# Patient Record
Sex: MALE | Race: WHITE | URBAN - METROPOLITAN AREA
[De-identification: names, ages, dates, MRNs, and addresses within clinical notes are randomized per-mention and may not be internally consistent; named-entity substitution may affect disease eponyms.]

---

## 2019-08-13 ENCOUNTER — OFFICE VISIT (OUTPATIENT)
Dept: DERMATOLOGY | Age: 49
End: 2019-08-13
Payer: COMMERCIAL

## 2019-08-13 ENCOUNTER — HOSPITAL ENCOUNTER (OUTPATIENT)
Age: 49
Setting detail: SPECIMEN
Discharge: HOME | End: 2019-08-13
Payer: COMMERCIAL

## 2019-08-13 VITALS
HEIGHT: 71 IN | SYSTOLIC BLOOD PRESSURE: 108 MMHG | WEIGHT: 156.6 LBS | HEART RATE: 92 BPM | OXYGEN SATURATION: 95 % | DIASTOLIC BLOOD PRESSURE: 70 MMHG | BODY MASS INDEX: 21.92 KG/M2

## 2019-08-13 DIAGNOSIS — L82.1 SEBORRHEIC KERATOSES: ICD-10-CM

## 2019-08-13 DIAGNOSIS — L57.8 ACTINIC SKIN DAMAGE: ICD-10-CM

## 2019-08-13 DIAGNOSIS — L81.4 SOLAR LENTIGO: ICD-10-CM

## 2019-08-13 DIAGNOSIS — D48.5 NEOPLASM OF UNCERTAIN BEHAVIOR OF SKIN: Primary | ICD-10-CM

## 2019-08-13 PROCEDURE — 3017F COLORECTAL CA SCREEN DOC REV: CPT | Performed by: DERMATOLOGY

## 2019-08-13 PROCEDURE — G8420 CALC BMI NORM PARAMETERS: HCPCS | Performed by: DERMATOLOGY

## 2019-08-13 PROCEDURE — 99203 OFFICE O/P NEW LOW 30 MIN: CPT | Performed by: DERMATOLOGY

## 2019-08-13 PROCEDURE — 1036F TOBACCO NON-USER: CPT | Performed by: DERMATOLOGY

## 2019-08-13 PROCEDURE — 11102 TANGNTL BX SKIN SINGLE LES: CPT | Performed by: DERMATOLOGY

## 2019-08-13 PROCEDURE — G8427 DOCREV CUR MEDS BY ELIG CLIN: HCPCS | Performed by: DERMATOLOGY

## 2019-08-13 RX ORDER — ATORVASTATIN CALCIUM 20 MG/1
TABLET, FILM COATED ORAL
Refills: 11 | COMMUNITY
Start: 2019-07-31

## 2019-08-13 RX ORDER — BUSPIRONE HYDROCHLORIDE 5 MG/1
TABLET ORAL
Refills: 0 | Status: ON HOLD | COMMUNITY
Start: 2019-08-12 | End: 2020-08-25

## 2019-08-13 RX ORDER — AZITHROMYCIN 250 MG/1
250 TABLET, FILM COATED ORAL
COMMUNITY

## 2019-08-13 RX ORDER — LEVETIRACETAM 500 MG/1
500 TABLET ORAL
Status: ON HOLD | COMMUNITY
End: 2020-08-25

## 2019-08-13 RX ORDER — LIDOCAINE HYDROCHLORIDE AND EPINEPHRINE 10; 10 MG/ML; UG/ML
0.5 INJECTION, SOLUTION INFILTRATION; PERINEURAL ONCE
Status: COMPLETED | OUTPATIENT
Start: 2019-08-13 | End: 2019-08-13

## 2019-08-13 RX ORDER — DICYCLOMINE HYDROCHLORIDE 10 MG/1
CAPSULE ORAL
Refills: 0 | COMMUNITY
Start: 2019-08-12

## 2019-08-13 RX ORDER — ALBUTEROL SULFATE 90 UG/1
2 AEROSOL, METERED RESPIRATORY (INHALATION)
COMMUNITY

## 2019-08-13 RX ORDER — TOPIRAMATE 50 MG/1
50 TABLET, FILM COATED ORAL
COMMUNITY

## 2019-08-13 RX ORDER — OMEPRAZOLE 20 MG/1
CAPSULE, DELAYED RELEASE ORAL
Refills: 0 | COMMUNITY
Start: 2019-06-20

## 2019-08-13 RX ORDER — ALBUTEROL SULFATE 2.5 MG/3ML
SOLUTION RESPIRATORY (INHALATION)
Refills: 5 | COMMUNITY
Start: 2019-07-31

## 2019-08-13 RX ORDER — LEVETIRACETAM 250 MG/1
250 TABLET ORAL
COMMUNITY

## 2019-08-13 RX ADMIN — LIDOCAINE HYDROCHLORIDE AND EPINEPHRINE 0.5 ML: 10; 10 INJECTION, SOLUTION INFILTRATION; PERINEURAL at 16:19

## 2019-08-13 NOTE — PROGRESS NOTES
Dermatology Patient Note  [facility] PHYSICIANS  [facility]  [address] [address]  Dept: [phone]  Dept Fax: [phone]: 8/13/2019   REFERRING PROVIDER: No ref. provider found      Enio Scanlon is a 44 y.o. male  who presents today in the office for:    New Patient (Pt would like to have a full body skin check. He states that his dad had skin cancer. He has some lesions he is concerned about. )       HISTORY OF PRESENT ILLNESS:  44 y.o. male presenting for lesions  Location: arms and legs  Duration: months to years  Symptoms: some are sore  Course: persistent, changing  Exacerbating factors: none  Prior treatments: none  Dad had skin cancer      CURRENT MEDICATIONS:   Current Outpatient Medications   Medication Sig Dispense Refill    dicyclomine (BENTYL) 10 MG capsule TAKE 1 CAPSULE BY ORAL ROUTE 3 TIMES EVERY DAY  0    busPIRone (BUSPAR) 5 MG tablet TAKE 1 TABLET BY ORAL ROUTE 2 TIMES EVERY DAY AS NEEDED  0    albuterol sulfate  (90 Base) MCG/ACT inhaler Inhale 2 puffs into the lungs      albuterol (PROVENTIL) (2.5 MG/3ML) 0.083% nebulizer solution USE 1 AMPULE IN BREATHING MACHINE FOUR TIMES A DAY AS NEEDED  5    ipratropium (ATROVENT) 0.02 % nebulizer solution USE 1 AMPULE IN BREATHING MACHINE EVERY 6 HOURS  5    topiramate (TOPAMAX) 50 MG tablet Take 50 mg by mouth      levETIRAcetam (KEPPRA) 500 MG tablet Take 500 mg by mouth      levETIRAcetam (KEPPRA) 250 MG tablet Take 250 mg by mouth      azithromycin (ZITHROMAX) 250 MG tablet Take 250 mg by mouth      atorvastatin (LIPITOR) 20 MG tablet TAKE 1 TABLET (20 MG TOTAL) BY MOUTH DAILY.   11    omeprazole (PRILOSEC) 20 MG delayed release capsule TAKE 1 CAPSULE (20 MG TOTAL) BY MOUTH DAILY.  0     Current Facility-Administered Medications   Medication Dose Route Frequency Provider Last Rate Last Dose    lidocaine-EPINEPHrine 1 [id number] injection 0.5 mL  0.5 mL Intradermal Once Felicia L is irritation from clothing with itching or bleeding. There is no way to prevent new spots from forming. Some lotions with alpha hydroxyl acids may make the areas feel smoother with regular use but will not eliminate them. OTC freezing techniques are available but usually not effective. When to [facility]  If a spot on the skin is growing, bleeding, painful, or itchy, or any other concerning changes, then see your doctor. BIOPSY WOUND CARE    A biopsy is where a small piece of skin tissue is removed and examined by a pathologist.  When a biopsy is done, there is a small wound site that requires proper care to prevent infection and scarring. Some biopsies require sutures and their removal.    How to Care for Biopsy Wound    A.  Leave band-aid or dressing on for 24 hours. B. Wash two times a day with soap and water. C.  Let the wound air dry, then apply Vaseline ointment and cover with a Band-Aid       unless otherwise instructed by your provider. D. If there is slight discomfort, you may give acetaminophen or ibuprofen. When To Call the Doctor    Call the Dermatology Clinic or your doctor if any of the following occur:    A. Redness and swelling  B. Tenderness and warm to touch  C.  Drainage from wound  D. Fever    Biopsy Results    Biopsy results are usually available in 1-2 weeks. We provide biopsy results in letters for begin results or we will call for any concerning results. If you have not heard from our staff please call the office within 2 weeks. Please call our office with any concerns at [phone]. Follow-up: No follow-ups on file. This note was created with the assistance of a speech-recognition program.  Although the intention is to generate a document that actually reflects the content of the visit, no guarantees can be provided that every mistake has been identified and corrected byediting.     Electronically signed by Yamile Forde MD on 8/13/19 at 2:03 PM

## 2019-08-13 NOTE — PATIENT INSTRUCTIONS
Biopsy Wound    A.  Leave band-aid or dressing on for 24 hours. B. Wash two times a day with soap and water. C.  Let the wound air dry, then apply Vaseline ointment and cover with a Band-Aid       unless otherwise instructed by your provider. D. If there is slight discomfort, you may give acetaminophen or ibuprofen. When To Call the Doctor    Call the Dermatology Clinic or your doctor if any of the following occur:    A. Redness and swelling  B. Tenderness and warm to touch  C.  Drainage from wound  D. Fever    Biopsy Results    Biopsy results are usually available in 1-2 weeks. We provide biopsy results in letters for begin results or we will call for any concerning results. If you have not heard from our staff please call the office within 2 weeks. Please call our office with any concerns at [phone].

## 2019-08-15 LAB — DERMATOLOGY PATHOLOGY REPORT: NORMAL

## 2020-07-16 ENCOUNTER — TELEPHONE (OUTPATIENT)
Dept: GASTROENTEROLOGY | Age: 50
End: 2020-07-16

## 2020-07-27 ENCOUNTER — OFFICE VISIT (OUTPATIENT)
Dept: GASTROENTEROLOGY | Age: 50
End: 2020-07-27
Payer: COMMERCIAL

## 2020-07-27 ENCOUNTER — TELEPHONE (OUTPATIENT)
Dept: GASTROENTEROLOGY | Age: 50
End: 2020-07-27

## 2020-07-27 VITALS — HEIGHT: 71 IN | RESPIRATION RATE: 20 BRPM | WEIGHT: 181 LBS | BODY MASS INDEX: 25.34 KG/M2 | TEMPERATURE: 98.5 F

## 2020-07-27 PROCEDURE — G8427 DOCREV CUR MEDS BY ELIG CLIN: HCPCS | Performed by: INTERNAL MEDICINE

## 2020-07-27 PROCEDURE — G8419 CALC BMI OUT NRM PARAM NOF/U: HCPCS | Performed by: INTERNAL MEDICINE

## 2020-07-27 PROCEDURE — 99204 OFFICE O/P NEW MOD 45 MIN: CPT | Performed by: INTERNAL MEDICINE

## 2020-07-27 PROCEDURE — 3017F COLORECTAL CA SCREEN DOC REV: CPT | Performed by: INTERNAL MEDICINE

## 2020-07-27 PROCEDURE — 4004F PT TOBACCO SCREEN RCVD TLK: CPT | Performed by: INTERNAL MEDICINE

## 2020-07-27 PROCEDURE — APPSS45 APP SPLIT SHARED TIME 31-45 MINUTES: Performed by: NURSE PRACTITIONER

## 2020-07-27 NOTE — PROGRESS NOTES
Reason for Referral:   Lavern Del Angel MD  [address], [address], [address]     HISTORY OF PRESENT ILLNESS:   Chief Complaint   Patient presents with    New Patient     Painful swallowing/ bchp. Patient states he isn't having any smytoms. New patient being seen for odynophagia. Patient reports that a few months ago he had chest pain and painful swallowing. He denies any use of biphosphonates, NSAIDS, tetracyclines, etc.  He does use proventil, atrovent nebulizers and albuterol rescue inhaler as needed. Over the past month he has not had any odynophagia, dysphagia. No dyspeptic symptoms. Bowel movements satisfactory. No melena, hematochezia. No family hx of colon cancer, IBD, pancreatitis, liver disease. Son has EoE. No hx of liver disease, pancreatitis. Has hx of COPD  Does smoke    Past Medical,Family, and Social History reviewed and does contribute to the patient presentingcondition. Patient's PMH/PSH,SH,PSYCH Hx, MEDs, ALLERGIES, and ROS were all reviewed and updated in the appropriate sections. PAST MEDICAL HISTORY:  No past medical history on file. No past surgical history on file.     CURRENT MEDICATIONS:    Current Outpatient Medications:     dicyclomine (BENTYL) 10 MG capsule, TAKE 1 CAPSULE BY ORAL ROUTE 3 TIMES EVERY DAY, Disp: , Rfl: 0    busPIRone (BUSPAR) 5 MG tablet, TAKE 1 TABLET BY ORAL ROUTE 2 TIMES EVERY DAY AS NEEDED, Disp: , Rfl: 0    albuterol sulfate  (90 Base) MCG/ACT inhaler, Inhale 2 puffs into the lungs, Disp: , Rfl:     albuterol (PROVENTIL) (2.5 MG/3ML) 0.083% nebulizer solution, USE 1 AMPULE IN BREATHING MACHINE FOUR TIMES A DAY AS NEEDED, Disp: , Rfl: 5    ipratropium (ATROVENT) 0.02 % nebulizer solution, USE 1 AMPULE IN BREATHING MACHINE EVERY 6 HOURS, Disp: , Rfl: 5    topiramate (TOPAMAX) 50 MG tablet, Take 50 mg by mouth, Disp: , Rfl:     levETIRAcetam (KEPPRA) 500 MG tablet, Take 500 mg by mouth, Disp: , Rfl:     levETIRAcetam (KEPPRA) 250 MG tablet, Take 250 mg by mouth, Disp: , Rfl:     azithromycin (ZITHROMAX) 250 MG tablet, Take 250 mg by mouth, Disp: , Rfl:     atorvastatin (LIPITOR) 20 MG tablet, TAKE 1 TABLET (20 MG TOTAL) BY MOUTH DAILY. , Disp: , Rfl: 11    omeprazole (PRILOSEC) 20 MG delayed release capsule, TAKE 1 CAPSULE (20 MG TOTAL) BY MOUTH DAILY. , Disp: , Rfl: 0    ALLERGIES:   Allergies   Allergen Reactions    Codeine        FAMILY HISTORY: No family history on file.       SOCIAL HISTORY:   Social History     Socioeconomic History    Marital status:      Spouse name: Not on file    Number of children: Not on file    Years of education: Not on file    Highest education level: Not on file   Occupational History    Not on file   Social Needs    Financial resource strain: Not on file    Food insecurity     Worry: Not on file     Inability: Not on file    Transportation needs     Medical: Not on file     Non-medical: Not on file   Tobacco Use    Smoking status: Current Some Day Smoker    Smokeless tobacco: Never Used   Substance and Sexual Activity    Alcohol use: Yes     Comment: occ    Drug use: Yes     Types: Marijuana    Sexual activity: Not on file   Lifestyle    Physical activity     Days per week: Not on file     Minutes per session: Not on file    Stress: Not on file   Relationships    Social connections     Talks on phone: Not on file     Gets together: Not on file     Attends Roman [employer] service: Not on file     Active member of club or organization: Not on file     Attends meetings of clubs or organizations: Not on file     Relationship status: Not on file    Intimate partner violence     Fear of current or ex partner: Not on file     Emotionally abused: Not on file     Physically abused: Not on file     Forced sexual activity: Not on file   Other Topics Concern    Not on file   Social History Narrative    Not on file       REVIEW OF SYSTEMS: Review of Systems   Constitutional: Positive for unexpected weight change. HENT: Positive for dental problem. Eyes: Positive for visual disturbance. Musculoskeletal: Positive for arthralgias. Psychiatric/Behavioral: Positive for sleep disturbance. The patient is nervous/anxious. All other systems reviewed and are negative. PHYSICAL EXAMINATION: Vital signs reviewed per the nursing documentation. Temp 98.5 °F (36.9 °C)   Resp 20   Ht 5' 11\" (1.803 m)   Wt 181 lb (82.1 kg)   BMI 25.24 kg/m²   Body mass index is 25.24 kg/m². Physical Exam  Vitals signs and nursing note reviewed. Constitutional:       General: He is not in acute distress. Appearance: He is well-developed. HENT:      Head: Normocephalic and atraumatic. Mouth/Throat:      Pharynx: No oropharyngeal exudate. Eyes:      General: No scleral icterus. Conjunctiva/sclera: Conjunctivae normal.      Pupils: Pupils are equal, round, and reactive to light. Neck:      Musculoskeletal: Normal range of motion and neck supple. Thyroid: No thyromegaly. Trachea: No tracheal deviation. Cardiovascular:      Rate and Rhythm: Normal rate and regular rhythm. Heart sounds: Normal heart sounds. No murmur. Pulmonary:      Effort: Pulmonary effort is normal. No respiratory distress. Breath sounds: Normal breath sounds. No wheezing or rales. Abdominal:      General: Bowel sounds are normal. There is no distension. Palpations: Abdomen is soft. There is no hepatomegaly or mass. Tenderness: There is no abdominal tenderness. There is no guarding or rebound. Hernia: No hernia is present. Lymphadenopathy:      Cervical: No cervical adenopathy. Skin:     General: Skin is warm and dry. Findings: No erythema or rash. Neurological:      Mental Status: He is alert and oriented to person, place, and time. Cranial Nerves: No cranial nerve deficit.    Psychiatric:         Thought Content:

## 2020-07-27 NOTE — TELEPHONE ENCOUNTER
egd and colonoscopy ordered by Emery Crooks CNP at office visit on 07/27/20. Palomo Helms will check his schedule and call back to schedule.

## 2020-07-31 RX ORDER — POLYETHYLENE GLYCOL 3350 17 G/17G
POWDER, FOR SOLUTION ORAL
Qty: 238 G | Refills: 0 | Status: SHIPPED | OUTPATIENT
Start: 2020-07-31

## 2020-08-03 NOTE — TELEPHONE ENCOUNTER
Talked to Palomo Helms to inform him of Covid-19 testing on 08/21/20 @ 1 pm at [address] [place] [address]. He voiced confirmation.

## 2020-08-21 ENCOUNTER — HOSPITAL ENCOUNTER (OUTPATIENT)
Dept: PREADMISSION TESTING | Age: 50
Setting detail: SPECIMEN
Discharge: HOME | End: 2020-08-25
Payer: COMMERCIAL

## 2020-08-21 PROCEDURE — U0003 INFECTIOUS AGENT DETECTION BY NUCLEIC ACID (DNA OR RNA); SEVERE ACUTE RESPIRATORY SYNDROME CORONAVIRUS 2 (SARS-COV-2) (CORONAVIRUS DISEASE [COVID-19]), AMPLIFIED PROBE TECHNIQUE, MAKING USE OF HIGH THROUGHPUT TECHNOLOGIES AS DESCRIBED BY CMS-2020-01-R: HCPCS

## 2020-08-22 PROCEDURE — U0003 INFECTIOUS AGENT DETECTION BY NUCLEIC ACID (DNA OR RNA); SEVERE ACUTE RESPIRATORY SYNDROME CORONAVIRUS 2 (SARS-COV-2) (CORONAVIRUS DISEASE [COVID-19]), AMPLIFIED PROBE TECHNIQUE, MAKING USE OF HIGH THROUGHPUT TECHNOLOGIES AS DESCRIBED BY CMS-2020-01-R: HCPCS

## 2020-08-24 ENCOUNTER — TELEPHONE (OUTPATIENT)
Dept: GASTROENTEROLOGY | Age: 50
End: 2020-08-24

## 2020-08-24 LAB — SARS-COV-2, NAA: NOT DETECTED

## 2020-08-24 NOTE — TELEPHONE ENCOUNTER
Talked to [facility] regarding Steph Haney, she states he can proceed with EGD/colonoscopy on 08/25/20 and she will let Dr Carlyle Gray know he had been taking Iburpofen. Returned call to Steph Haney and he will keep procedure and will start bowel prep.

## 2020-08-24 NOTE — TELEPHONE ENCOUNTER
Arin Kim called to say he has taken 800 mg of Ibuprofen for approximately 5 days, with the last dose being on 08/23/20. He his scheduled for an EGD/colon with Dr Jl Addison on 08/25/20. Would he be ok to proceed with the procedure?

## 2020-08-25 ENCOUNTER — HOSPITAL ENCOUNTER (OUTPATIENT)
Age: 50
Setting detail: OUTPATIENT SURGERY
Discharge: HOME | End: 2020-08-25
Attending: INTERNAL MEDICINE | Admitting: INTERNAL MEDICINE
Payer: COMMERCIAL

## 2020-08-25 ENCOUNTER — ANESTHESIA EVENT (OUTPATIENT)
Dept: ENDOSCOPY | Age: 50
End: 2020-08-25
Payer: COMMERCIAL

## 2020-08-25 ENCOUNTER — ANESTHESIA (OUTPATIENT)
Dept: ENDOSCOPY | Age: 50
End: 2020-08-25
Payer: COMMERCIAL

## 2020-08-25 VITALS
TEMPERATURE: 98 F | HEART RATE: 55 BPM | OXYGEN SATURATION: 96 % | RESPIRATION RATE: 16 BRPM | WEIGHT: 180 LBS | HEIGHT: 71 IN | BODY MASS INDEX: 25.2 KG/M2 | DIASTOLIC BLOOD PRESSURE: 78 MMHG | SYSTOLIC BLOOD PRESSURE: 126 MMHG

## 2020-08-25 VITALS — DIASTOLIC BLOOD PRESSURE: 61 MMHG | TEMPERATURE: 96.8 F | SYSTOLIC BLOOD PRESSURE: 102 MMHG | OXYGEN SATURATION: 100 %

## 2020-08-25 PROCEDURE — 6360000002 HC RX W HCPCS: Performed by: ANESTHESIOLOGY

## 2020-08-25 PROCEDURE — 2580000003 HC RX 258: Performed by: ANESTHESIOLOGY

## 2020-08-25 PROCEDURE — 43239 EGD BIOPSY SINGLE/MULTIPLE: CPT | Performed by: INTERNAL MEDICINE

## 2020-08-25 PROCEDURE — 6360000002 HC RX W HCPCS: Performed by: NURSE ANESTHETIST, CERTIFIED REGISTERED

## 2020-08-25 PROCEDURE — 88305 TISSUE EXAM BY PATHOLOGIST: CPT

## 2020-08-25 PROCEDURE — 2709999900 HC NON-CHARGEABLE SUPPLY: Performed by: INTERNAL MEDICINE

## 2020-08-25 PROCEDURE — 3609012400 HC EGD TRANSORAL BIOPSY SINGLE/MULTIPLE: Performed by: INTERNAL MEDICINE

## 2020-08-25 PROCEDURE — 7100000000 HC PACU RECOVERY - FIRST 15 MIN: Performed by: INTERNAL MEDICINE

## 2020-08-25 PROCEDURE — 3700000001 HC ADD 15 MINUTES (ANESTHESIA): Performed by: INTERNAL MEDICINE

## 2020-08-25 PROCEDURE — 3700000000 HC ANESTHESIA ATTENDED CARE: Performed by: INTERNAL MEDICINE

## 2020-08-25 PROCEDURE — 3609010300 HC COLONOSCOPY W/BIOPSY SINGLE/MULTIPLE: Performed by: INTERNAL MEDICINE

## 2020-08-25 PROCEDURE — 7100000001 HC PACU RECOVERY - ADDTL 15 MIN: Performed by: INTERNAL MEDICINE

## 2020-08-25 PROCEDURE — 45385 COLONOSCOPY W/LESION REMOVAL: CPT | Performed by: INTERNAL MEDICINE

## 2020-08-25 RX ORDER — HYDROXYZINE PAMOATE 25 MG/1
25 CAPSULE ORAL PRN
Status: ON HOLD | COMMUNITY
End: 2020-08-25

## 2020-08-25 RX ORDER — ONDANSETRON 2 MG/ML
4 INJECTION INTRAMUSCULAR; INTRAVENOUS
Status: DISCONTINUED | OUTPATIENT
Start: 2020-08-25 | End: 2020-08-25 | Stop reason: HOSPADM

## 2020-08-25 RX ORDER — SODIUM CHLORIDE, SODIUM LACTATE, POTASSIUM CHLORIDE, CALCIUM CHLORIDE 600; 310; 30; 20 MG/100ML; MG/100ML; MG/100ML; MG/100ML
INJECTION, SOLUTION INTRAVENOUS CONTINUOUS
Status: DISCONTINUED | OUTPATIENT
Start: 2020-08-25 | End: 2020-08-25 | Stop reason: HOSPADM

## 2020-08-25 RX ORDER — DIPHENHYDRAMINE HYDROCHLORIDE 50 MG/ML
12.5 INJECTION INTRAMUSCULAR; INTRAVENOUS
Status: DISCONTINUED | OUTPATIENT
Start: 2020-08-25 | End: 2020-08-25 | Stop reason: HOSPADM

## 2020-08-25 RX ORDER — KETOROLAC TROMETHAMINE 30 MG/ML
30 INJECTION, SOLUTION INTRAMUSCULAR; INTRAVENOUS ONCE
Status: COMPLETED | OUTPATIENT
Start: 2020-08-25 | End: 2020-08-25

## 2020-08-25 RX ORDER — OXYCODONE HYDROCHLORIDE AND ACETAMINOPHEN 5; 325 MG/1; MG/1
1 TABLET ORAL PRN
Status: DISCONTINUED | OUTPATIENT
Start: 2020-08-25 | End: 2020-08-25 | Stop reason: HOSPADM

## 2020-08-25 RX ORDER — PROPOFOL 10 MG/ML
INJECTION, EMULSION INTRAVENOUS PRN
Status: DISCONTINUED | OUTPATIENT
Start: 2020-08-25 | End: 2020-08-25 | Stop reason: SDUPTHER

## 2020-08-25 RX ORDER — LABETALOL HYDROCHLORIDE 5 MG/ML
5 INJECTION, SOLUTION INTRAVENOUS EVERY 10 MIN PRN
Status: DISCONTINUED | OUTPATIENT
Start: 2020-08-25 | End: 2020-08-25 | Stop reason: HOSPADM

## 2020-08-25 RX ORDER — LEVETIRACETAM 1000 MG/1
TABLET ORAL
COMMUNITY
Start: 2020-07-08

## 2020-08-25 RX ORDER — PROPOFOL 10 MG/ML
INJECTION, EMULSION INTRAVENOUS CONTINUOUS PRN
Status: DISCONTINUED | OUTPATIENT
Start: 2020-08-25 | End: 2020-08-25 | Stop reason: SDUPTHER

## 2020-08-25 RX ORDER — OXYCODONE HYDROCHLORIDE AND ACETAMINOPHEN 5; 325 MG/1; MG/1
2 TABLET ORAL PRN
Status: DISCONTINUED | OUTPATIENT
Start: 2020-08-25 | End: 2020-08-25 | Stop reason: HOSPADM

## 2020-08-25 RX ORDER — HYDROXYZINE HYDROCHLORIDE 25 MG/1
TABLET, FILM COATED ORAL
COMMUNITY
Start: 2020-06-19

## 2020-08-25 RX ADMIN — KETOROLAC TROMETHAMINE 30 MG: 30 INJECTION, SOLUTION INTRAMUSCULAR at 10:34

## 2020-08-25 RX ADMIN — PROPOFOL 100 MG: 10 INJECTION, EMULSION INTRAVENOUS at 10:46

## 2020-08-25 RX ADMIN — PROPOFOL 150 MCG/KG/MIN: 10 INJECTION, EMULSION INTRAVENOUS at 10:46

## 2020-08-25 RX ADMIN — SODIUM CHLORIDE, POTASSIUM CHLORIDE, SODIUM LACTATE AND CALCIUM CHLORIDE: 600; 310; 30; 20 INJECTION, SOLUTION INTRAVENOUS at 10:28

## 2020-08-25 ASSESSMENT — PAIN - FUNCTIONAL ASSESSMENT: PAIN_FUNCTIONAL_ASSESSMENT: 0-10

## 2020-08-25 ASSESSMENT — PAIN SCALES - GENERAL
PAINLEVEL_OUTOF10: 2
PAINLEVEL_OUTOF10: 4
PAINLEVEL_OUTOF10: 5

## 2020-08-25 ASSESSMENT — PULMONARY FUNCTION TESTS
PIF_VALUE: 0
PIF_VALUE: 1
PIF_VALUE: 0
PIF_VALUE: 1
PIF_VALUE: 0

## 2020-08-25 ASSESSMENT — PAIN DESCRIPTION - DESCRIPTORS: DESCRIPTORS: CRAMPING

## 2020-08-25 ASSESSMENT — LIFESTYLE VARIABLES: SMOKING_STATUS: 1

## 2020-08-25 ASSESSMENT — PAIN DESCRIPTION - PAIN TYPE: TYPE: ACUTE PAIN

## 2020-08-25 ASSESSMENT — PAIN DESCRIPTION - LOCATION: LOCATION: ABDOMEN

## 2020-08-25 ASSESSMENT — PAIN DESCRIPTION - ORIENTATION: ORIENTATION: ANTERIOR

## 2020-08-25 ASSESSMENT — PAIN DESCRIPTION - ONSET: ONSET: AWAKENED FROM SLEEP

## 2020-08-25 ASSESSMENT — PAIN DESCRIPTION - FREQUENCY: FREQUENCY: INTERMITTENT

## 2020-08-25 NOTE — ANESTHESIA PRE PROCEDURE
Department of Anesthesiology  Preprocedure Note       Name:  Rober Walton   Age:  44 y.o.  :  1970                                          MRN:  [id number]         Date:  2020      Surgeon: Cherise Schwartz):  Retia Oppenheim, MD    Procedure: Procedure(s):  EGD  COLORECTAL CANCER SCREENING, HIGH RISK    Medications prior to admission:   Prior to Admission medications    Medication Sig Start Date End Date Taking? Authorizing Provider   levETIRAcetam (KEPPRA) 1000 MG tablet TAKE 1 TABLET BY ORAL ROUTE  EVERY 12 HOURS 20  Yes Historical Provider, MD   hydrOXYzine (ATARAX) 25 MG tablet TAKE 1 - 2 TABLET BY ORAL ROUTE  EVERY DAY AS NEEDED 20  Yes Historical Provider, MD   OMEPRAZOLE PO Take 20 mg by mouth daily 20  Yes Historical Provider, MD   polyethylene glycol (GLYCOLAX) 17 GM/SCOOP powder Use as directed by following your patient instructions given by office. 20  Yes Retia Oppenheim, MD   bisacodyl (DULCOLAX) 5 MG EC tablet Use as directed per instructions given by physician office. 20  Yes Retia Oppenheim, MD   albuterol sulfate  (90 Base) MCG/ACT inhaler Inhale 2 puffs into the lungs   Yes Historical Provider, MD   albuterol (PROVENTIL) (2.5 MG/3ML) 0.083% nebulizer solution USE 1 AMPULE IN BREATHING MACHINE FOUR TIMES A DAY AS NEEDED 19  Yes Historical Provider, MD   ipratropium (ATROVENT) 0.02 % nebulizer solution USE 1 AMPULE IN BREATHING MACHINE EVERY 6 HOURS 19  Yes Historical Provider, MD   levETIRAcetam (KEPPRA) 250 MG tablet Take 250 mg by mouth   Yes Historical Provider, MD   atorvastatin (LIPITOR) 20 MG tablet TAKE 1 TABLET (20 MG TOTAL) BY MOUTH DAILY. 19  Yes Historical Provider, MD   omeprazole (PRILOSEC) 20 MG delayed release capsule TAKE 1 CAPSULE (20 MG TOTAL) BY MOUTH DAILY.  19  Yes Historical Provider, MD   dicyclomine (BENTYL) 10 MG capsule TAKE 1 CAPSULE BY ORAL ROUTE 3 TIMES EVERY DAY 19   Historical Provider, MD results found for: PROTIME, INR, APTT    HCG (If Applicable): No results found for: PREGTESTUR, PREGSERUM, HCG, HCGQUANT     ABGs: No results found for: PHART, PO2ART, MMK3ZVJ, NWB6LAU, BEART, M6CJXSUL     Type & Screen (If Applicable):  No results found for: LABABO, LABRH    Drug/Infectious Status (If Applicable):  No results found for: HIV, HEPCAB    COVID-19 Screening (If Applicable):   Lab Results   Component Value Date    COVID19 Not Detected 08/22/2020         Anesthesia Evaluation  Patient summary reviewed and Nursing notes reviewed no history of anesthetic complications:   Airway: Mallampati: II  TM distance: >3 FB   Neck ROM: full  Mouth opening: > = 3 FB Dental:      Comment: Poor dentition - denies loose teeth. Pulmonary:normal exam  breath sounds clear to auscultation  (+) COPD:  current smoker                          ROS comment: H/o thoracotomy left in the past   Cardiovascular:    (+) hyperlipidemia        Rhythm: regular  Rate: normal                    Neuro/Psych:   (+) seizures (Petit mal - last epidsode 2 weeks ago):,             GI/Hepatic/Renal: Neg GI/Hepatic/Renal ROS            Endo/Other:    (+) : arthritis: OA., .                 Abdominal:           Vascular: negative vascular ROS. Anesthesia Plan      MAC     ASA 3       Induction: intravenous. MIPS: Prophylactic antiemetics administered. Anesthetic plan and risks discussed with patient. Plan discussed with CRNA. Patient requesting pain medication for a lt foot pain - Toradol IVP ordered.       Vera Stanley MD   8/25/2020

## 2020-08-25 NOTE — ANESTHESIA POSTPROCEDURE EVALUATION
Department of Anesthesiology  Postprocedure Note    Patient: Pari Villegas  MRN: [id number]  YOB: 1970  Date of evaluation: 8/25/2020  Time:  12:52 PM     Procedure Summary     Date:  08/25/20 Room / Location:  [facility] ENDO 01 / [facility] ENDO    Anesthesia Start:  6862 Anesthesia Stop:  4244    Procedures:       EGD BIOPSY (N/A Esophagus)      COLONOSCOPY WITH BIOPSY (N/A ) Diagnosis:       (SCREEN FOR COLON CANCERPAINFUL SWALLOWING)      (PAT ON ADMIT)    Surgeon:  Suzette Clemente MD Responsible Provider:  Tony Jackson MD    Anesthesia Type:  MAC ASA Status:  3          Anesthesia Type: MAC    Raegan Phase I: Raegan Score: 10    Raegan Phase II:      Last vitals: Reviewed and per EMR flowsheets.        Anesthesia Post Evaluation    Comments: POST- ANESTHESIA EVALUATION       Pt Name: Pari Villegas  MRN: [id number]  [place]: 1970  Date of evaluation: 8/25/2020  Time:  12:52 PM      /78   Pulse 55   Temp 98 °F (36.7 °C)   Resp 16   Ht 5' 11\" (1.803 m)   Wt 180 lb (81.6 kg)   SpO2 96%   BMI 25.10 kg/m²      Consciousness Level  Awake  Cardiopulmonary Status  Stable  Pain Adequately Treated YES  Nausea / Vomiting  NO  Adequate Hydration  YES  Anesthesia Related Complications NONE      Electronically signed by Tony Jackson MD on 8/25/2020 at 12:52 PM

## 2020-08-25 NOTE — H&P
tobacco: Never Used   Substance and Sexual Activity    Alcohol use: Yes     Comment: occ    Drug use: Yes     Types: Marijuana    Sexual activity: Not on file   Lifestyle    Physical activity     Days per week: Not on file     Minutes per session: Not on file    Stress: Not on file   Relationships    Social connections     Talks on phone: Not on file     Gets together: Not on file     Attends Rastafarian service: Not on file     Active member of club or organization: Not on file     Attends meetings of clubs or organizations: Not on file     Relationship status: Not on file    Intimate partner violence     Fear of current or ex partner: Not on file     Emotionally abused: Not on file     Physically abused: Not on file     Forced sexual activity: Not on file   Other Topics Concern    Not on file   Social History Narrative    Not on file           REVIEW OF SYSTEMS      Allergies   Allergen Reactions    Codeine        No current facility-administered medications on file prior to encounter. Current Outpatient Medications on File Prior to Encounter   Medication Sig Dispense Refill    OMEPRAZOLE PO Take 20 mg by mouth daily      levETIRAcetam (KEPPRA) 1000 MG tablet TAKE 1 TABLET BY ORAL ROUTE  EVERY 12 HOURS      hydrOXYzine (ATARAX) 25 MG tablet TAKE 1 - 2 TABLET BY ORAL ROUTE  EVERY DAY AS NEEDED      polyethylene glycol (GLYCOLAX) 17 GM/SCOOP powder Use as directed by following your patient instructions given by office. 238 g 0    bisacodyl (DULCOLAX) 5 MG EC tablet Use as directed per instructions given by physician office.  2 tablet 0    dicyclomine (BENTYL) 10 MG capsule TAKE 1 CAPSULE BY ORAL ROUTE 3 TIMES EVERY DAY  0    albuterol sulfate  (90 Base) MCG/ACT inhaler Inhale 2 puffs into the lungs      albuterol (PROVENTIL) (2.5 MG/3ML) 0.083% nebulizer solution USE 1 AMPULE IN BREATHING MACHINE FOUR TIMES A DAY AS NEEDED  5    ipratropium (ATROVENT) 0.02 % nebulizer solution USE 1 AMPULE IN BREATHING MACHINE EVERY 6 HOURS  5    topiramate (TOPAMAX) 50 MG tablet Take 50 mg by mouth      levETIRAcetam (KEPPRA) 250 MG tablet Take 250 mg by mouth      azithromycin (ZITHROMAX) 250 MG tablet Take 250 mg by mouth      atorvastatin (LIPITOR) 20 MG tablet TAKE 1 TABLET (20 MG TOTAL) BY MOUTH DAILY. 11    omeprazole (PRILOSEC) 20 MG delayed release capsule TAKE 1 CAPSULE (20 MG TOTAL) BY MOUTH DAILY. 0       Negative except for what is mentioned in the HPI. GENERAL PHYSICAL EXAM     Vitals: /79   Pulse 74   Temp 97.8 °F (36.6 °C) (Infrared)   Resp 16   Ht 5' 11\" (1.803 m)   Wt 180 lb (81.6 kg)   SpO2 96%   BMI 25.10 kg/m²  Body mass index is 25.1 kg/m². GENERAL APPEARANCE:   Kai Roberson is 44 y.o.,  male, not obese, nourished, conscious, alert. Does not appear to be distress or pain at this time. SKIN:  Warm, dry, no cyanosis or jaundice. HEAD:  Normocephalic, atraumatic, no swelling or tenderness. EYES:  Pupils equal, reactive to light. EARS:  No discharge, no marked hearing loss. NOSE:  No rhinorrhea, epistaxis or septal deformity. THROAT:  Not congested. No ulceration bleeding or discharge. NECK:  No stiffness, trachea central.  No palpable masses or L.N.                 CHEST:  Symmetrical and equal on expansion. HEART:  RRR S1 > S2. No audible murmurs or gallops. LUNGS:  Equal on expansion, normal breath sounds. No adventitious sounds. ABDOMEN: Soft on palpation. No dysphagia, No localized tenderness. No guarding or rigidity. No palpable hepatosplenomegaly. LYMPHATICS:  No palpable cervical lymphadenopathy. LOCOMOTOR, BACK AND SPINE:  No tenderness or deformities. EXTREMITIES:  Symmetrical, no pretibial edema. Devins sign negative.   No discoloration or ulcerations. NEUROLOGIC:  The patient is conscious, alert, oriented,Cranial nerve II-XII intact, taste and smell were not examined. No apparent focal sensory or motor deficits. PROVISIONAL DIAGNOSES / SURGERY:    SCREEN FOR COLON CANCER  PAINFUL SWALLOWING  EGD   COLORECTAL CANCER SCREENING, HIGH RISK    There are no active problems to display for this patient.           RAMOS Carrasquillo - CNP on 8/25/2020 at 10:10 AM

## 2020-08-25 NOTE — OP NOTE
ESOPHAGOGASTRODUODENOSCOPY   ( EGD )  DATE OF PROCEDURE: 8/25/2020     SURGEON: Yuliya Vasquez MD    ASSISTANT: None    PREOPERATIVE DIAGNOSIS: Chronic GERD, odynophagia. Procedure performed to evaluate esophageal pathology    POSTOPERATIVE DIAGNOSIS: Small sliding hiatal hernia. No esophageal stricture, Calvo's mucosa or peptic esophagitis seen. OPERATION: Upper GI endoscopy with Biopsy    ANESTHESIA: MAC    ESTIMATED BLOOD LOSS: None    COMPLICATIONS: None. SPECIMENS:  Was Obtained: Random biopsies taken from the esophagus evaluate microscopic esophagitis    HISTORY: The patient is a 44y.o. year old male with history of above preop diagnosis. I recommended esophagogastroduodenoscopy with possible biopsy and I explained the risk, benefits, expected outcome, and alternatives to the procedure. Risks included but are not limited to bleeding, infection, respiratory distress, hypotension, and perforation of the esophagus, stomach, or duodenum. Patient understands and is in agreement. PROCEDURE: The patient was given IV conscious sedation. The patient's SPO2 remained above 90% throughout the procedure. Cetacaine spray given. Patient placed in left lateral position. Olympus  videogastroscope was inserted orally under vision into the esophagus without difficulty and advanced into the stomach then through the pylorus up to the second part of duodenum. Findings:    Retropharyngeal area was grossly normal appearing    Esophagus: normal.  No signs of peptic esophagitis, Calvo's mucosa seen. 2 cm long sliding hiatal hernia. Squamocolumnar junction is at about 40 cm from incisor teeth and lower esophageal sphincter opens normally.     Multiple biopsies taken from the body of the esophagus to rule out eosinophilic esophagitis, microscopic esophagitis    Stomach:    Fundus and Cardia Examined in Retroflexed View: normal    Body: normal    Antrum: normal    Duodenum:     Descending: normal    Bulb: normal    While withdrawing the scope the above findings were verified and the scope was removed. The patient has tolerated the procedure without unusual events. Recommendations/Plan:   1. F/U Biopsies  2. F/U In Office as instructed  3.  Discussed with the family                   Electronically signed by Navjot More MD  on 8/25/2020 at 10:51 AM

## 2020-08-25 NOTE — OP NOTE
COLONOSCOPY    DATE OF PROCEDURE: 8/25/2020    SURGEON: Sarah Miner MD    ASSISTANT: None    PREOPERATIVE DIAGNOSIS: Screening colonoscopy    POSTOPERATIVE DIAGNOSIS: Polyp right colon    OPERATION: Total colonoscopy and snare polypectomy    ANESTHESIA: MAC    ESTIMATED BLOOD LOSS: None    COMPLICATIONS: None     SPECIMENS:  Was Obtained: Polyp right colon    HISTORY: The patient is a 44y.o. year old male with history of above preop diagnosis. I recommended colonoscopy with possible biopsy or polypectomy and I explained the risk, benefits, expected outcome, and alternatives to the procedure. Risks included but are not limited to bleeding, infection, respiratory distress, hypotension, and perforation of the colon and possibility of missing a lesion. The patient understands and is in agreement. PROCEDURE:  The patient's SPO2 remained above 90% throughout the procedure. Digital rectal exam was normal.  The colonoscope was inserted through the anus into the rectum and advanced under direct vision to the cecum without difficulty. Terminal ileum was examined for approximately 2 inches. The prep was adequate. Findings:  Terminal ileum: normal    Cecum/Ascending colon: abnormal: In the upper part of the right colon there is a polyp which is about 7 mm flat. This polyp is removed using cold snare. Transverse colon: normal    Descending/Sigmoid colon: normal    Rectum/Anus: examined in normal and retroflexed positions and was normal    Withdrawal Time was (minutes): 12          The colon was decompressed and the scope was removed. The patient tolerated the procedure without unusual events. During the procedure, the patient's blood pressure, pulse and oxygen saturation remained stable and documented. No unusual events occurred during the procedure. Patient was transferred to recovery room and will be discharged when criteria is met. Recommendations/Plan:   1. F/U Biopsies  2.  F/U In Office as instructed  3. Discussed with the family  4. High fiber diet   5. Precautions to avoid constipation     Next colonoscopy: 3 -5 years.   If Colonoscopy is less than 10 years the recommended reason is due:polyps    Electronically signed by Yuliya Ram MD  on 8/25/2020 at 11:14 AM

## 2020-08-26 LAB — SURGICAL PATHOLOGY REPORT: NORMAL

## 2020-09-14 ENCOUNTER — OFFICE VISIT (OUTPATIENT)
Dept: GASTROENTEROLOGY | Age: 50
End: 2020-09-14
Payer: COMMERCIAL

## 2020-09-14 VITALS — BODY MASS INDEX: 26.4 KG/M2 | HEIGHT: 71 IN | WEIGHT: 188.6 LBS

## 2020-09-14 PROCEDURE — G8427 DOCREV CUR MEDS BY ELIG CLIN: HCPCS | Performed by: INTERNAL MEDICINE

## 2020-09-14 PROCEDURE — G8419 CALC BMI OUT NRM PARAM NOF/U: HCPCS | Performed by: INTERNAL MEDICINE

## 2020-09-14 PROCEDURE — 3017F COLORECTAL CA SCREEN DOC REV: CPT | Performed by: INTERNAL MEDICINE

## 2020-09-14 PROCEDURE — 99214 OFFICE O/P EST MOD 30 MIN: CPT | Performed by: INTERNAL MEDICINE

## 2020-09-14 PROCEDURE — 4004F PT TOBACCO SCREEN RCVD TLK: CPT | Performed by: INTERNAL MEDICINE

## 2020-09-14 ASSESSMENT — ENCOUNTER SYMPTOMS
VOMITING: 0
WHEEZING: 0
CHOKING: 0
ABDOMINAL DISTENTION: 0
SINUS PAIN: 0
RECTAL PAIN: 0
BLOOD IN STOOL: 0
DIARRHEA: 0
VOICE CHANGE: 0
TROUBLE SWALLOWING: 0
SORE THROAT: 0
COUGH: 0
ANAL BLEEDING: 0
CONSTIPATION: 0
NAUSEA: 0
ABDOMINAL PAIN: 1
SHORTNESS OF BREATH: 0
SINUS PRESSURE: 0

## 2020-09-14 NOTE — PROGRESS NOTES
(KEPPRA) 1000 MG tablet, TAKE 1 TABLET BY ORAL ROUTE  EVERY 12 HOURS, Disp: , Rfl:     hydrOXYzine (ATARAX) 25 MG tablet, TAKE 1 - 2 TABLET BY ORAL ROUTE  EVERY DAY AS NEEDED, Disp: , Rfl:     OMEPRAZOLE PO, Take 20 mg by mouth daily, Disp: , Rfl:     polyethylene glycol (GLYCOLAX) 17 GM/SCOOP powder, Use as directed by following your patient instructions given by office. , Disp: 238 g, Rfl: 0    bisacodyl (DULCOLAX) 5 MG EC tablet, Use as directed per instructions given by physician office. , Disp: 2 tablet, Rfl: 0    dicyclomine (BENTYL) 10 MG capsule, TAKE 1 CAPSULE BY ORAL ROUTE 3 TIMES EVERY DAY, Disp: , Rfl: 0    albuterol sulfate  (90 Base) MCG/ACT inhaler, Inhale 2 puffs into the lungs, Disp: , Rfl:     albuterol (PROVENTIL) (2.5 MG/3ML) 0.083% nebulizer solution, USE 1 AMPULE IN BREATHING MACHINE FOUR TIMES A DAY AS NEEDED, Disp: , Rfl: 5    ipratropium (ATROVENT) 0.02 % nebulizer solution, USE 1 AMPULE IN BREATHING MACHINE EVERY 6 HOURS, Disp: , Rfl: 5    topiramate (TOPAMAX) 50 MG tablet, Take 50 mg by mouth, Disp: , Rfl:     levETIRAcetam (KEPPRA) 250 MG tablet, Take 250 mg by mouth, Disp: , Rfl:     azithromycin (ZITHROMAX) 250 MG tablet, Take 250 mg by mouth, Disp: , Rfl:     atorvastatin (LIPITOR) 20 MG tablet, TAKE 1 TABLET (20 MG TOTAL) BY MOUTH DAILY. , Disp: , Rfl: 11    omeprazole (PRILOSEC) 20 MG delayed release capsule, TAKE 1 CAPSULE (20 MG TOTAL) BY MOUTH DAILY. , Disp: , Rfl: 0    ALLERGIES:   Allergies   Allergen Reactions    Codeine        FAMILY HISTORY: History reviewed. No pertinent family history.       SOCIAL HISTORY:   Social History     Socioeconomic History    Marital status:      Spouse name: Not on file    Number of children: Not on file    Years of education: Not on file    Highest education level: Not on file   Occupational History    Not on file   Social Needs    Financial resource strain: Not on file    Food insecurity     Worry: Not on file Inability: Not on file    Transportation needs     Medical: Not on file     Non-medical: Not on file   Tobacco Use    Smoking status: Current Some Day Smoker     Packs/day: 0.25    Smokeless tobacco: Never Used    Tobacco comment: a pack a week   Substance and Sexual Activity    Alcohol use: Yes     Comment: occ    Drug use: Yes     Frequency: 10.0 times per week     Types: Marijuana    Sexual activity: Not on file   Lifestyle    Physical activity     Days per week: Not on file     Minutes per session: Not on file    Stress: Not on file   Relationships    Social connections     Talks on phone: Not on file     Gets together: Not on file     Attends [employer] service: Not on file     Active member of club or organization: Not on file     Attends meetings of clubs or organizations: Not on file     Relationship status: Not on file    Intimate partner violence     Fear of current or ex partner: Not on file     Emotionally abused: Not on file     Physically abused: Not on file     Forced sexual activity: Not on file   Other Topics Concern    Not on file   Social History Narrative    Not on file         REVIEW OF SYSTEMS:         Review of Systems   Constitutional: Negative for appetite change, fatigue and unexpected weight change. HENT: Positive for dental problem. Negative for postnasal drip, sinus pressure, sinus pain, sore throat, trouble swallowing and voice change. Eyes: Positive for visual disturbance (glasses). Respiratory: Negative for cough, choking, shortness of breath and wheezing. Gastrointestinal: Positive for abdominal pain (LLQ pressure). Negative for abdominal distention, anal bleeding, blood in stool, constipation, diarrhea, nausea, rectal pain and vomiting. Genitourinary: Negative for difficulty urinating. Musculoskeletal: Negative for arthralgias. Allergic/Immunologic: Negative for environmental allergies and food allergies.    Neurological: Negative for dizziness, weakness, light-headedness, numbness and headaches. Hematological: Does not bruise/bleed easily. Psychiatric/Behavioral: Positive for sleep disturbance. The patient is nervous/anxious. All other systems reviewed and are negative. PHYSICAL EXAMINATION: Vital signs reviewed per the nursing documentation. Ht 5' 11\" (1.803 m)   Wt 188 lb 9.6 oz (85.5 kg)   BMI 26.30 kg/m²   Body mass index is 26.3 kg/m². Physical Exam  Vitals signs and nursing note reviewed. Constitutional:       General: He is not in acute distress. Appearance: He is well-developed. HENT:      Head: Normocephalic and atraumatic. Mouth/Throat:      Pharynx: No oropharyngeal exudate. Eyes:      General: No scleral icterus. Conjunctiva/sclera: Conjunctivae normal.      Pupils: Pupils are equal, round, and reactive to light. Neck:      Musculoskeletal: Normal range of motion and neck supple. Thyroid: No thyromegaly. Trachea: No tracheal deviation. Cardiovascular:      Rate and Rhythm: Normal rate and regular rhythm. Heart sounds: Normal heart sounds. No murmur. Pulmonary:      Effort: Pulmonary effort is normal. No respiratory distress. Breath sounds: Normal breath sounds. No wheezing or rales. Abdominal:      General: Bowel sounds are normal. There is no distension. Palpations: Abdomen is soft. There is no hepatomegaly or mass. Tenderness: There is no abdominal tenderness. There is no guarding. Hernia: No hernia is present. Comments: No peripheral signs of ch. Liver disease. Rectus muscle dehiscence. Genitourinary:     Rectum: Normal.   Lymphadenopathy:      Cervical: No cervical adenopathy. Skin:     General: Skin is warm and dry. Findings: No erythema or rash. Neurological:      Mental Status: He is alert and oriented to person, place, and time. Cranial Nerves: No cranial nerve deficit. Psychiatric:         Thought Content:  Thought content normal. LABORATORY DATA: Reviewed  No results found for: WBC, HGB, HCT, MCV, PLT, NA, K, CL, CO2, BUN, CREATININE, LABPROT, LABALBU, GGT, BILITOT, ALKPHOS, AST, ALT, INR      No results found for: RBC, HGB, MCV, MCH, MCHC, RDW, MPV, BASOPCT, LYMPHSABS, MONOSABS, NEUTROABS, EOSABS, BASOSABS      DIAGNOSTIC TESTING:     No results found. Assessment  1. Lt flank pain        Plan  At present, basing on the examination I could not identify any mass or localized tenderness in the left flank. However it appears that he has rectus abdominis muscle dehiscence. Patient stated that he is very concerned about this discomfort which is bothering him. For this reason to further evaluate and manage, he needs CT of the abdomen and pelvis that is ordered. Also he may need urine analysis. Discussed with the patient regarding EGD findings. Patient is reassured. Advised to take frequent small feedings. Also explained regarding colonoscopy findings. Will need colonoscopy in about 3 years time. Advised to see me in the next 4 to 6 weeks. Thank you for allowing me to participate in the care of Mr. Sergo Esposito. For any further questions please do not hesitate to contact me. I have reviewed and agree with the ROS entered by the MA/LPN.          Harsh Weston MD,FACP, [facility]  Board Certified in Gastroenterology and [address] [place] [address] Gastroenterology  Office #: [phone]

## 2020-09-16 ENCOUNTER — TELEPHONE (OUTPATIENT)
Dept: GASTROENTEROLOGY | Age: 50
End: 2020-09-16

## 2020-09-16 NOTE — TELEPHONE ENCOUNTER
Spoke with [facility], offered to schedule 6 week visit. He declined until he has his CT scan scheduled, phone number to [employer] given.

## 2020-09-28 ENCOUNTER — HOSPITAL ENCOUNTER (OUTPATIENT)
Dept: CT IMAGING | Age: 50
Discharge: HOME | End: 2020-09-30
Payer: COMMERCIAL

## 2020-09-28 PROCEDURE — 6360000004 HC RX CONTRAST MEDICATION: Performed by: INTERNAL MEDICINE

## 2020-09-28 PROCEDURE — 2580000003 HC RX 258: Performed by: INTERNAL MEDICINE

## 2020-09-28 PROCEDURE — 74177 CT ABD & PELVIS W/CONTRAST: CPT

## 2020-09-28 RX ORDER — SODIUM CHLORIDE 0.9 % (FLUSH) 0.9 %
10 SYRINGE (ML) INJECTION ONCE
Status: COMPLETED | OUTPATIENT
Start: 2020-09-28 | End: 2020-09-28

## 2020-09-28 RX ORDER — 0.9 % SODIUM CHLORIDE 0.9 %
80 INTRAVENOUS SOLUTION INTRAVENOUS ONCE
Status: COMPLETED | OUTPATIENT
Start: 2020-09-28 | End: 2020-09-28

## 2020-09-28 RX ADMIN — IOHEXOL 50 ML: 240 INJECTION, SOLUTION INTRATHECAL; INTRAVASCULAR; INTRAVENOUS; ORAL at 13:05

## 2020-09-28 RX ADMIN — IOPAMIDOL 100 ML: 755 INJECTION, SOLUTION INTRAVENOUS at 13:12

## 2020-09-28 RX ADMIN — Medication 10 ML: at 13:07

## 2020-09-28 RX ADMIN — SODIUM CHLORIDE 80 ML: 9 INJECTION, SOLUTION INTRAVENOUS at 13:13

## 2020-11-17 ENCOUNTER — VIRTUAL VISIT (OUTPATIENT)
Dept: GASTROENTEROLOGY | Age: 50
End: 2020-11-17
Payer: COMMERCIAL

## 2020-11-17 PROCEDURE — G8427 DOCREV CUR MEDS BY ELIG CLIN: HCPCS | Performed by: NURSE PRACTITIONER

## 2020-11-17 PROCEDURE — 99213 OFFICE O/P EST LOW 20 MIN: CPT | Performed by: NURSE PRACTITIONER

## 2020-11-17 PROCEDURE — 3017F COLORECTAL CA SCREEN DOC REV: CPT | Performed by: NURSE PRACTITIONER

## 2020-11-17 ASSESSMENT — ENCOUNTER SYMPTOMS: ABDOMINAL PAIN: 1

## 2020-11-17 NOTE — PROGRESS NOTES
2020    TELEHEALTH EVALUATION -- Audio/Visual (During SSATU-43 public health emergency)    HPI:    Michael Rogers (:  1970) has requested an audio/video evaluation for the following concern(s): This is a doxy visit. Patient has hx of L flank pain, chronic. He had CT abdomen/pelvis with no acute process noted. Does have CT findings suggesting chronic pancreatitis. Upon interview patient states that he has had recurrent pancreatitis int he past.  Last episode of pancreatitis about 1 year ago. During his last visit he denied pancreatitis. Patient told his pancreatitis was due to alcohol. Since, he states he rarely drinks alcohol. He does smoke but is currently on the patch and trying to quit. Patient still has mild, dull upper abdominal pain. On physical examination he was noted to have rectus muscle dehiscence. Bowel movements satisfactory. No cramping, bloating. No melena, hematochezia. No dysphagia, odynophagia, dyspeptic symptoms. Has good appetite. There is no weight loss. Review of Systems   Gastrointestinal: Positive for abdominal pain. All other systems reviewed and are negative. Prior to Visit Medications    Medication Sig Taking? Authorizing Provider   levETIRAcetam (KEPPRA) 1000 MG tablet TAKE 1 TABLET BY ORAL ROUTE  EVERY 12 HOURS  Historical Provider, MD   hydrOXYzine (ATARAX) 25 MG tablet TAKE 1 - 2 TABLET BY ORAL ROUTE  EVERY DAY AS NEEDED  Historical Provider, MD   OMEPRAZOLE PO Take 20 mg by mouth daily  Historical Provider, MD   polyethylene glycol (GLYCOLAX) 17 GM/SCOOP powder Use as directed by following your patient instructions given by office. Baudilio Rivera MD   bisacodyl (DULCOLAX) 5 MG EC tablet Use as directed per instructions given by physician office.   Baudilio Rivera MD   dicyclomine (BENTYL) 10 MG capsule TAKE 1 CAPSULE BY ORAL ROUTE 3 TIMES EVERY DAY  Historical Provider, MD   albuterol sulfate  (90 Base) MCG/ACT inhaler Inhale 2 puffs into the lungs  Historical Provider, MD   albuterol (PROVENTIL) (2.5 MG/3ML) 0.083% nebulizer solution USE 1 AMPULE IN BREATHING MACHINE FOUR TIMES A DAY AS NEEDED  Historical Provider, MD   ipratropium (ATROVENT) 0.02 % nebulizer solution USE 1 AMPULE IN BREATHING MACHINE EVERY 6 HOURS  Historical Provider, MD   topiramate (TOPAMAX) 50 MG tablet Take 50 mg by mouth  Historical Provider, MD   levETIRAcetam (KEPPRA) 250 MG tablet Take 250 mg by mouth  Historical Provider, MD   azithromycin (ZITHROMAX) 250 MG tablet Take 250 mg by mouth  Historical Provider, MD   atorvastatin (LIPITOR) 20 MG tablet TAKE 1 TABLET (20 MG TOTAL) BY MOUTH DAILY. Historical Provider, MD   omeprazole (PRILOSEC) 20 MG delayed release capsule TAKE 1 CAPSULE (20 MG TOTAL) BY MOUTH DAILY. Historical Provider, MD       Social History     Tobacco Use    Smoking status: Current Some Day Smoker     Packs/day: 0.25    Smokeless tobacco: Never Used    Tobacco comment: a pack a week   Substance Use Topics    Alcohol use: Yes     Comment: occ    Drug use: Yes     Frequency: 10.0 times per week     Types: Marijuana        Constitutional: [x] Appears well-developed and well-nourished [] No apparent distress      [] Abnormal-   Mental status  [] Alert and awake  [] Oriented to person/place/time []Able to follow commands      Eyes:  EOM    [x]  Normal  [] Abnormal-  Sclera  [x]  Normal  [] Abnormal -         Discharge [x]  None visible  [] Abnormal -    HENT:   [x] Normocephalic, atraumatic.   [] Abnormal   [x] Mouth/Throat: Mucous membranes are moist.     External Ears [x] Normal  [] Abnormal-     Neck: [x] No visualized mass     Pulmonary/Chest: [x] Respiratory effort normal.  [x] No visualized signs of difficulty breathing or respiratory distress        [] Abnormal-      Musculoskeletal:   [x] Normal gait with no signs of ataxia         [x] Normal range of motion of neck        [] Abnormal-       Neurological: [x] No Facial Asymmetry (Cranial nerve 7 motor function) (limited exam to video visit)          [x] No gaze palsy        [] Abnormal-         Skin:        [x] No significant exanthematous lesions or discoloration noted on facial skin         [] Abnormal-            Psychiatric:       [x] Normal Affect [] No Hallucinations        [] Abnormal-     Other pertinent observable physical exam findings-     ASSESSMENT/PLAN:      Diagnosis Orders   1. Chronic pancreatitis, unspecified pancreatitis type ([facility] [address]     At present patient appears stable. Has mild upper abdominal pain, chronic. This may be related to chronic pancreatitis. Patient states that he was told this was from alcohol in the past.  To check lipase and call for results. He may need EUS but patient not agreeable at present time. To follow-up 3 months and decide further management. Return in about 3 months (around 2/17/2021). Michael Rogers is a 44 y.o. male being evaluated by a Virtual Visit (video visit) encounter to address concerns as mentioned above. A caregiver was present when appropriate. Due to this being a TeleHealth encounter (During OYBXS-53 public health emergency), evaluation of the following organ systems was limited: Vitals/Constitutional/EENT/Resp/CV/GI//MS/Neuro/Skin/Heme-Lymph-Imm. Pursuant to the emergency declaration under the [address] [place] [address] authority and the [employer] and [employer] Act, this Virtual Visit was conducted with patient's (and/or legal guardian's) consent, to reduce the patient's risk of exposure to COVID-19 and provide necessary medical care. The patient (and/or legal guardian) has also been advised to contact this office for worsening conditions or problems, and seek emergency medical treatment and/or call 911 if deemed necessary.      Patient identification was verified at the start of the visit: Yes    Total time spent on this encounter: 11-20 minutes    Services were provided through a video synchronous discussion virtually to substitute for in-person clinic visit. Patient and provider were located at their individual homes. --RAMOS Montes De Oca NP on 11/17/2020 at 11:58 AM    An electronic signature was used to authenticate this note.

## (undated) DEVICE — BITEBLOCK 54FR W/ DENT RIM BLOX

## (undated) DEVICE — GLOVE ORANGE PI 7   MSG9070

## (undated) DEVICE — SNARE ENDOSCP AD SM L240CM LOOP W1.3CM SHTH DIA2.4MM POLYP

## (undated) DEVICE — DEFENDO AIR WATER SUCTION AND BIOPSY VALVE KIT FOR  OLYMPUS: Brand: DEFENDO AIR/WATER/SUCTION AND BIOPSY VALVE

## (undated) DEVICE — FORCEPS BX L240CM JAW DIA2.8MM L CAP W/ NDL MIC MESH TOOTH

## (undated) DEVICE — ENDO KIT W/SYRINGE: Brand: MEDLINE INDUSTRIES, INC.